# Patient Record
Sex: FEMALE | Race: WHITE | NOT HISPANIC OR LATINO | Employment: FULL TIME | URBAN - METROPOLITAN AREA
[De-identification: names, ages, dates, MRNs, and addresses within clinical notes are randomized per-mention and may not be internally consistent; named-entity substitution may affect disease eponyms.]

---

## 2017-03-08 ENCOUNTER — OFFICE VISIT (OUTPATIENT)
Dept: FAMILY MEDICINE | Facility: CLINIC | Age: 37
End: 2017-03-08
Payer: COMMERCIAL

## 2017-03-08 VITALS
TEMPERATURE: 97.8 F | WEIGHT: 196 LBS | HEIGHT: 63 IN | BODY MASS INDEX: 34.73 KG/M2 | DIASTOLIC BLOOD PRESSURE: 77 MMHG | HEART RATE: 74 BPM | SYSTOLIC BLOOD PRESSURE: 119 MMHG | OXYGEN SATURATION: 97 %

## 2017-03-08 DIAGNOSIS — J45.20 MILD INTERMITTENT ASTHMA WITHOUT COMPLICATION: ICD-10-CM

## 2017-03-08 DIAGNOSIS — B37.31 CANDIDIASIS OF VULVA AND VAGINA: ICD-10-CM

## 2017-03-08 DIAGNOSIS — Z00.00 ENCOUNTER FOR ROUTINE ADULT HEALTH EXAMINATION WITHOUT ABNORMAL FINDINGS: ICD-10-CM

## 2017-03-08 DIAGNOSIS — R82.90 BAD ODOR OF URINE: Primary | ICD-10-CM

## 2017-03-08 LAB
ALBUMIN UR-MCNC: NEGATIVE MG/DL
APPEARANCE UR: CLEAR
BILIRUB UR QL STRIP: NEGATIVE
CHOLEST SERPL-MCNC: 218 MG/DL
COLOR UR AUTO: YELLOW
GLUCOSE SERPL-MCNC: 89 MG/DL (ref 70–99)
GLUCOSE UR STRIP-MCNC: NEGATIVE MG/DL
HDLC SERPL-MCNC: 47 MG/DL
HGB UR QL STRIP: NEGATIVE
KETONES UR STRIP-MCNC: NEGATIVE MG/DL
LDLC SERPL CALC-MCNC: 149 MG/DL
LEUKOCYTE ESTERASE UR QL STRIP: NEGATIVE
MICRO REPORT STATUS: ABNORMAL
NITRATE UR QL: NEGATIVE
NONHDLC SERPL-MCNC: 171 MG/DL
PH UR STRIP: 5.5 PH (ref 5–7)
SP GR UR STRIP: <=1.005 (ref 1–1.03)
SPECIMEN SOURCE: ABNORMAL
TRIGL SERPL-MCNC: 110 MG/DL
URN SPEC COLLECT METH UR: NORMAL
UROBILINOGEN UR STRIP-ACNC: 0.2 EU/DL (ref 0.2–1)
WET PREP SPEC: ABNORMAL

## 2017-03-08 PROCEDURE — 82947 ASSAY GLUCOSE BLOOD QUANT: CPT | Performed by: FAMILY MEDICINE

## 2017-03-08 PROCEDURE — 81003 URINALYSIS AUTO W/O SCOPE: CPT | Performed by: FAMILY MEDICINE

## 2017-03-08 PROCEDURE — 99213 OFFICE O/P EST LOW 20 MIN: CPT | Mod: 25 | Performed by: FAMILY MEDICINE

## 2017-03-08 PROCEDURE — 80061 LIPID PANEL: CPT | Performed by: FAMILY MEDICINE

## 2017-03-08 PROCEDURE — 87210 SMEAR WET MOUNT SALINE/INK: CPT | Performed by: FAMILY MEDICINE

## 2017-03-08 PROCEDURE — 99395 PREV VISIT EST AGE 18-39: CPT | Performed by: FAMILY MEDICINE

## 2017-03-08 PROCEDURE — 36415 COLL VENOUS BLD VENIPUNCTURE: CPT | Performed by: FAMILY MEDICINE

## 2017-03-08 RX ORDER — ALBUTEROL SULFATE 90 UG/1
2 AEROSOL, METERED RESPIRATORY (INHALATION) EVERY 4 HOURS PRN
Qty: 1 INHALER | Refills: 12 | Status: SHIPPED | OUTPATIENT
Start: 2017-03-08

## 2017-03-08 RX ORDER — LORATADINE 10 MG/1
10 TABLET ORAL DAILY
Qty: 90 TABLET | Refills: 3 | COMMUNITY
Start: 2017-03-08

## 2017-03-08 RX ORDER — FLUCONAZOLE 150 MG/1
150 TABLET ORAL
Qty: 4 TABLET | Refills: 0 | Status: SHIPPED | OUTPATIENT
Start: 2017-03-08

## 2017-03-08 ASSESSMENT — PATIENT HEALTH QUESTIONNAIRE - PHQ9: 5. POOR APPETITE OR OVEREATING: NOT AT ALL

## 2017-03-08 ASSESSMENT — ANXIETY QUESTIONNAIRES
2. NOT BEING ABLE TO STOP OR CONTROL WORRYING: NOT AT ALL
3. WORRYING TOO MUCH ABOUT DIFFERENT THINGS: NOT AT ALL
1. FEELING NERVOUS, ANXIOUS, OR ON EDGE: NOT AT ALL
7. FEELING AFRAID AS IF SOMETHING AWFUL MIGHT HAPPEN: NOT AT ALL
GAD7 TOTAL SCORE: 1
6. BECOMING EASILY ANNOYED OR IRRITABLE: SEVERAL DAYS
5. BEING SO RESTLESS THAT IT IS HARD TO SIT STILL: NOT AT ALL

## 2017-03-08 NOTE — PATIENT INSTRUCTIONS
Thank you for choosing [facility].  You may be receiving a survey in the mail from Ginger Crockett regarding your visit today.  Please take a few minutes to complete and return the survey to let us know how we are doing.      If you have questions or concerns, please contact us via [employer] or you can contact your care team at [phone].    Our Clinic hours are:  Monday 6:40 am  to 7:00 pm  Tuesday -Friday 6:40 am to 5:00 pm    The [place] outpatient lab hours are:  Monday - Friday 6:10 am to 4:45 pm  Saturdays 7:00 am to 11:00 am  Appointments are required, call [phone]    If you have clinical questions after hours or would like to schedule an appointment,  call the clinic at [phone].

## 2017-03-08 NOTE — LETTER
[facility]  [address] [place] [address]  Phone: [phone]    March 9, 2017    Esther Workman  [address] [place] [address]          Dear MsYanet Workman,    The results of your recent lab tests were:    Lab test result showed high cholesterol. I highly recommend that she work on her diet and exercise .         Enclosed is a copy of these results.  If you have any further questions or problems, please contact our office.      Sincerely,      George Rodriguez MD / KAN

## 2017-03-08 NOTE — MR AVS SNAPSHOT
After Visit Summary   3/8/2017    Esther Workman    MRN: [id number]           Patient Information     Date Of Birth          1980        Visit Information        Provider Department      3/8/2017 9:40 AM George Rodriguez MD [facility]        Today's Diagnoses     Bad odor of urine    -  1    Mild intermittent asthma without complication        Candidiasis of vulva and vagina        Encounter for routine adult health examination without abnormal findings           Follow-ups after your visit        Who to contact     If you have questions or need follow up information about today's clinic visit or your schedule please contact [facility] directly at [phone].  Normal or non-critical lab and imaging results will be communicated to you by [employer], letter or phone within 4 business days after the clinic has received the results. If you do not hear from us within 7 days, please contact the clinic through [employer] or phone. If you have a critical or abnormal lab result, we will notify you by phone as soon as possible.  Submit refill requests through [employer] or call your pharmacy and they will forward the refill request to us. Please allow 3 business days for your refill to be completed.          Additional Information About Your Visit        MyChart Information     [employer] gives you secure access to your electronic health record. If you see a primary care provider, you can also send messages to your care team and make appointments. If you have questions, please call your primary care clinic.  If you do not have a primary care provider, please call [phone] and they will assist you.        Care EveryWhere ID     This is your Care EveryWhere ID. This could be used by other organizations to access your [place] medical records  [id number]        Your Vitals Were     Pulse Temperature Height Last Period Pulse Oximetry BMI (Body Mass Index)    74 97.8  F (36.6  C)  "(Tympanic) 5' 3\" (1.6 m) 08/15/2011 97% 34.72 kg/m2       Blood Pressure from Last 3 Encounters:   03/08/17 119/77   09/18/15 122/83   06/06/14 114/65    Weight from Last 3 Encounters:   03/08/17 196 lb (88.9 kg)   09/18/15 188 lb (85.3 kg)   06/06/14 189 lb (85.7 kg)              We Performed the Following     Glucose     Lipid Profile with reflex to direct LDL     UA reflex to Microscopic and Culture     Wet prep          Today's Medication Changes          These changes are accurate as of: 3/8/17 10:33 AM.  If you have any questions, ask your nurse or doctor.               Start taking these medicines.        Dose/Directions    fluconazole 150 MG tablet   Commonly known as:  DIFLUCAN   Used for:  Candidiasis of vulva and vagina   Started by:  George Rodriguez MD        Dose:  150 mg   Take 1 tablet (150 mg) by mouth every 3 days   Quantity:  4 tablet   Refills:  0            Where to get your medicines      These medications were sent to [facility] [address]  [address] [place] [address]     Phone:  [phone]     albuterol 108 (90 BASE) MCG/ACT Inhaler    fluconazole 150 MG tablet                Primary Care Provider Office Phone # Fax #    Leah Carolina Martinez [phone]       [address] [address]        Thank you!     Thank you for choosing [facility]  for your care. Our goal is always to provide you with excellent care. Hearing back from our patients is one way we can continue to improve our services. Please take a few minutes to complete the written survey that you may receive in the mail after your visit with us. Thank you!             Your Updated Medication List - Protect others around you: Learn how to safely use, store and throw away your medicines at www.disposemymeds.org.          This list is accurate as of: 3/8/17 10:33 AM.  Always use your most recent med list.       "             Brand Name Dispense Instructions for use    albuterol 108 (90 BASE) MCG/ACT Inhaler    PROAIR HFA/PROVENTIL HFA/VENTOLIN HFA    1 Inhaler    Inhale 2 puffs into the lungs every 4 hours as needed for shortness of breath / dyspnea       ALLEGRA PO      Take 30 mg by mouth daily       citalopram 40 MG tablet    celeXA    90 tablet    Take 1 tablet (40 mg) by mouth daily One daily       CLARITIN 10 MG tablet   Generic drug:  loratadine     90 tablet    Take 1 tablet (10 mg) by mouth daily       fluconazole 150 MG tablet    DIFLUCAN    4 tablet    Take 1 tablet (150 mg) by mouth every 3 days

## 2017-03-08 NOTE — PROGRESS NOTES
SUBJECTIVE:     CC: Esther Workman is an 37 year old woman who presents for preventive health visit.     Patient is a 37 yr old female here for her annual physical .She reports that she has had a vaginal odor for the last few days. She had a hysterectomy a few years back. She reports no vaginal discharge and no itching. No urinary symptoms . She is other wise healthy .She is also requesting medication refills on her inhaler.   Physical   Annual:     Getting at least 3 servings of Calcium per day::  Yes    Bi-annual eye exam::  Yes    Dental care twice a year::  Yes    Sleep apnea or symptoms of sleep apnea::  None    Diet::  Regular (no restrictions)    Frequency of exercise::  None    Taking medications regularly::  Yes    Medication side effects::  None    Additional concerns today::  YES         Asthma Follow-Up    Was ACT completed today?    Yes    ACT Total Scores 3/8/2017   ACT TOTAL SCORE -   ASTHMA ER VISITS -   ASTHMA HOSPITALIZATIONS -   ACT TOTAL SCORE (Goal Greater than or Equal to 20) 21   In the past 12 months, how many times did you visit the emergency room for your asthma without being admitted to the hospital? 0   In the past 12 months, how many times were you hospitalized overnight because of your asthma? 0       Recent asthma triggers that patient is dealing with: upper respiratory infections        Amount of exercise or physical activity: 4-5 days/week for an average of 15-30 minutes    Problems taking medications regularly: No    Medication side effects: none  Diet: regular (no restrictions)      ABDOMINAL PAIN     Onset: off and on 10yrs increased in Jan     Description:   Character: Dull ache and strong dull ache   Location: right middle  Quadrant and middle back  Radiation: Back    Intensity: moderate    Progression of Symptoms:  improving    Accompanying Signs & Symptoms:  Fever/Chills?: no   Gas/Bloating: YES  Nausea: YES  Vomitting: no   Diarrhea?: YES  Constipation:yes   Dysuria or  Hematuria: no    History:   Trauma: no   Previous similar pain: YES- before gallbladder surgery   Previous tests done: none    Precipitating factors:   Does the pain change with:     Food: no      BM: YES    Urination: no     Alleviating factors:  Lying on side     Therapies Tried and outcome: same     LMP:  not applicable     Vaginal Symptoms     Onset: 3-4-17    Description: Patient feel like she has an odor of cat pee  Vaginal Discharge: none   Itching (Pruritis): no   Burning sensation:  no   Odor: YES- cat pee    Accompanying Signs & Symptoms:  Pain with Urination: no   Abdominal Pain: YES- from something different   Fever: no    History:   Sexually active: YES  New Partner: no   Possibility of Pregnancy:  No    Precipitating factors:   Recent Antibiotic Use: no     Alleviating factors:  None    Therapies Tried and outcome: bathing     Today's PHQ-2 Score:   PHQ-2 ( 1999 Pfizer) 3/7/2017   Little interest or pleasure in doing things Not at all   Feeling down, depressed or hopeless Not at all   PHQ-2 Score 0       Abuse: Current or Past(Physical, Sexual or Emotional)- No  Do you feel safe in your environment - Yes    Social History   Substance Use Topics     Smoking status: Never Smoker     Smokeless tobacco: Never Used     Alcohol use Yes      Comment: VERY INFREQUENT     The patient does not drink >3 drinks per day nor >7 drinks per week.    Recent Labs   Lab Test  09/18/15   1021  07/03/13   0917   CHOL  207*  235*   HDL  42*  53   LDL  134*  162*   TRIG  153*  99   CHOLHDLRATIO  4.9  4.0       Reviewed orders with patient.  Reviewed health maintenance and updated orders accordingly - Yes    Mammo Decision Support:  Mammogram not appropriate for this patient based on age.    Pertinent mammograms are reviewed under the imaging tab.  History of abnormal Pap smear: Status post benign hysterectomy. Health Maintenance and Surgical History updated.    Reviewed and updated as needed this visit by clinical  staff  Tobacco  Allergies  Med Hx  Surg Hx  Fam Hx  Soc Hx        Reviewed and updated as needed this visit by Provider        Past Medical History   Diagnosis Date     Chickenpox       Past Surgical History   Procedure Laterality Date     Cholecystectomy, laporoscopic  2005     Hysterectomy vaginal  6/21/2012     Procedure: HYSTERECTOMY VAGINAL;  Total vaginal hysterectomy, cystoscopy;  Surgeon: Jennyfer Peck MD;  Location: [facility] OR       ROS:  C: NEGATIVE for fever, chills, change in weight  I: NEGATIVE for worrisome rashes, moles or lesions  E: NEGATIVE for vision changes or irritation  ENT: NEGATIVE for ear, mouth and throat problems  R: NEGATIVE for significant cough or SOB  B: NEGATIVE for masses, tenderness or discharge  CV: NEGATIVE for chest pain, palpitations or peripheral edema  GI: NEGATIVE for nausea, abdominal pain, heartburn, or change in bowel habits   female: Positive for vaginal odor  M: NEGATIVE for significant arthralgias or myalgia  N: NEGATIVE for weakness, dizziness or paresthesias  P: NEGATIVE for changes in mood or affect    Problem list, Medication list, Allergies, and Medical/Social/Surgical histories reviewed in [facility] and updated as appropriate.  Labs reviewed in EPIC  BP Readings from Last 3 Encounters:   03/08/17 119/77   09/18/15 122/83   06/06/14 114/65    Wt Readings from Last 3 Encounters:   03/08/17 196 lb (88.9 kg)   09/18/15 188 lb (85.3 kg)   06/06/14 189 lb (85.7 kg)                  Patient Active Problem List   Diagnosis     Intermittent asthma     CARDIOVASCULAR SCREENING; LDL GOAL LESS THAN 160     Mild major depression (H)     BMI 33.0-33.9,adult     S/P hysterectomy     Past Surgical History   Procedure Laterality Date     Cholecystectomy, laporoscopic  2005     Hysterectomy vaginal  6/21/2012     Procedure: HYSTERECTOMY VAGINAL;  Total vaginal hysterectomy, cystoscopy;  Surgeon: Jennyfer Peck MD;  Location: [facility] OR       Social History   Substance Use Topics      Smoking status: Never Smoker     Smokeless tobacco: Never Used     Alcohol use Yes      Comment: VERY INFREQUENT     Family History   Problem Relation Age of Onset     Hypertension Paternal Grandmother      DIABETES Paternal Grandmother      Hypertension Maternal Grandfather      Eye Disorder Maternal Grandfather      glaucoma and macular degen     GASTROINTESTINAL DISEASE Maternal Grandfather      ulcer     HEART DISEASE Maternal Grandfather      Depression Maternal Grandfather      HEART DISEASE Paternal Grandfather      Blood Disease Father      hepatitis     C.A.D. Father      HEART DISEASE Father 55     quad bypass -  of ARDS     Depression Mother      Hypertension Mother      Asthma No family hx of      Breast Cancer No family hx of      Cancer - colorectal No family hx of      Prostate Cancer No family hx of          Current Outpatient Prescriptions   Medication Sig Dispense Refill     loratadine (CLARITIN) 10 MG tablet Take 1 tablet (10 mg) by mouth daily 90 tablet 3     albuterol (PROAIR HFA/PROVENTIL HFA/VENTOLIN HFA) 108 (90 BASE) MCG/ACT Inhaler Inhale 2 puffs into the lungs every 4 hours as needed for shortness of breath / dyspnea 1 Inhaler 12     fluconazole (DIFLUCAN) 150 MG tablet Take 1 tablet (150 mg) by mouth every 3 days 4 tablet 0     citalopram (CELEXA) 40 MG tablet Take 1 tablet (40 mg) by mouth daily One daily (Patient not taking: Reported on 3/8/2017) 90 tablet 3     Fexofenadine HCl (ALLEGRA PO) Take 30 mg by mouth daily       [DISCONTINUED] albuterol (PROAIR HFA, PROVENTIL HFA, VENTOLIN HFA) 108 (90 BASE) MCG/ACT inhaler Inhale 2 puffs into the lungs every 4 hours as needed for shortness of breath / dyspnea 1 Inhaler 12     Allergies   Allergen Reactions     Polymyxin B Sulfate Itching     Trimethoprim Itching     OBJECTIVE:     [facility] 08/15/2011  EXAM:  GENERAL: healthy, alert and no distress  NECK: no adenopathy, no asymmetry, masses, or scars and thyroid normal to palpation  RESP:  lungs clear to auscultation - no rales, rhonchi or wheezes  CV: regular rate and rhythm, normal S1 S2, no S3 or S4, no murmur, click or rub, no peripheral edema and peripheral pulses strong  ABDOMEN: soft, nontender, no hepatosplenomegaly, no masses and bowel sounds normal   (female): normal female external genitalia, normal urethral meatus  and vaginal mucosa pink, moist, well rugated  MS: no gross musculoskeletal defects noted, no edema  SKIN: no suspicious lesions or rashes  Results for orders placed or performed in visit on 03/08/17   UA reflex to Microscopic and Culture   Result Value Ref Range    Color Urine Yellow     Appearance Urine Clear     Glucose Urine Negative NEG mg/dL    Bilirubin Urine Negative NEG    Ketones Urine Negative NEG mg/dL    Specific Gravity Urine <=1.005 1.003 - 1.035    Blood Urine Negative NEG    pH Urine 5.5 5.0 - 7.0 pH    Protein Albumin Urine Negative NEG mg/dL    Urobilinogen Urine 0.2 0.2 - 1.0 EU/dL    Nitrite Urine Negative NEG    Leukocyte Esterase Urine Negative NEG    Source Midstream Urine    Lipid Profile with reflex to direct LDL   Result Value Ref Range    Cholesterol 218 (H) <200 mg/dL    Triglycerides 110 <150 mg/dL    HDL Cholesterol 47 (L) >49 mg/dL    LDL Cholesterol Calculated 149 (H) <100 mg/dL    Non HDL Cholesterol 171 (H) <130 mg/dL   Glucose   Result Value Ref Range    Glucose 89 70 - 99 mg/dL   Wet prep   Result Value Ref Range    Specimen Description Vagina     Wet Prep (A)      No Trichomonas seen  No clue cells seen  Yeast seen      Micro Report Status FINAL 03/08/2017      ASSESSMENT/PLAN:     1. Encounter for routine adult health examination without abnormal findings  .Patient will be notified of results  - Lipid Profile with reflex to direct LDL  - Glucose    2. Bad odor of urine  UA negative  - UA reflex to Microscopic and Culture  - Wet prep    3. Mild intermittent asthma without complication  - albuterol (PROAIR HFA/PROVENTIL HFA/VENTOLIN HFA) 108  "(90 BASE) MCG/ACT Inhaler; Inhale 2 puffs into the lungs every 4 hours as needed for shortness of breath / dyspnea  Dispense: 1 Inhaler; Refill: 12    4. Candidiasis of vulva and vagina  - fluconazole (DIFLUCAN) 150 MG tablet; Take 1 tablet (150 mg) by mouth every 3 days  Dispense: 4 tablet; Refill: 0  - Wet prep was positive for yeast   COUNSELING:  Reviewed preventive health counseling, as reflected in patient instructions       Regular exercise       Healthy diet/nutrition       Vision screening         reports that she has never smoked. She has never used smokeless tobacco.    Estimated body mass index is 33.3 kg/(m^2) as calculated from the following:    Height as of 9/18/15: 5' 3\" (1.6 m).    Weight as of 9/18/15: 188 lb (85.3 kg).   Weight management plan: Discussed healthy diet and exercise guidelines and patient will follow up in 6 months in clinic to re-evaluate.    Counseling Resources:  ATP IV Guidelines  Pooled Cohorts Equation Calculator  Breast Cancer Risk Calculator  FRAX Risk Assessment  ICSI Preventive Guidelines  Dietary Guidelines for Americans, 2010  USDA's MyPlate  ASA Prophylaxis  Lung CA Screening    George Rodriguez MD  [facility]  Answers for HPI/ROS submitted by the patient on 3/7/2017   Q1: Little interest or pleasure in doing things: 0=Not at all  Q2: Feeling down, depressed or hopeless: 0=Not at all  PHQ-2 Score: 0    "

## 2017-03-08 NOTE — NURSING NOTE
"Chief Complaint   Patient presents with     Physical     Vaginal Problem     Abdominal Pain     Refill Request       Initial /77 (BP Location: Left arm, Cuff Size: Adult Regular)  Pulse 74  Temp 97.8  F (36.6  C) (Tympanic)  Ht 5' 3\" (1.6 m)  Wt 196 lb (88.9 kg)  LMP 08/15/2011  SpO2 97%  BMI 34.72 kg/m2 Estimated body mass index is 34.72 kg/(m^2) as calculated from the following:    Height as of this encounter: 5' 3\" (1.6 m).    Weight as of this encounter: 196 lb (88.9 kg).  Medication Reconciliation: complete  "

## 2017-03-09 ASSESSMENT — PATIENT HEALTH QUESTIONNAIRE - PHQ9: SUM OF ALL RESPONSES TO PHQ QUESTIONS 1-9: 0

## 2017-03-09 ASSESSMENT — ANXIETY QUESTIONNAIRES: GAD7 TOTAL SCORE: 1

## 2017-03-09 ASSESSMENT — ASTHMA QUESTIONNAIRES: ACT_TOTALSCORE: 21

## 2017-03-09 NOTE — PROGRESS NOTES
Please inform patient that lab test result showed high cholesterol. I highly recommend that she work on her diet and exercise .  Thank you.     George Rodriguez M.D.

## 2019-05-01 ENCOUNTER — TELEPHONE (OUTPATIENT)
Dept: FAMILY MEDICINE | Facility: CLINIC | Age: 39
End: 2019-05-01

## 2019-05-01 NOTE — TELEPHONE ENCOUNTER
5/1/2019    Call Regarding ReattributionPhysical    Attempt 1    Message with female    Comments: LEFT VOICEMAIL       Outreach   MELECIO

## 2019-11-07 ENCOUNTER — HEALTH MAINTENANCE LETTER (OUTPATIENT)
Age: 39
End: 2019-11-07

## 2019-12-04 NOTE — TELEPHONE ENCOUNTER
12/4/2019    Call Regarding ReattributionPhysical    Attempt 2    Comments: left miguel angel Boyer

## 2019-12-12 NOTE — TELEPHONE ENCOUNTER
12/12/2019    Call Regarding ReattributionPhysical       Attempt 3    Message on voicemail    Comments:       Outreach   GAY

## 2020-11-29 ENCOUNTER — HEALTH MAINTENANCE LETTER (OUTPATIENT)
Age: 40
End: 2020-11-29

## 2021-09-25 ENCOUNTER — HEALTH MAINTENANCE LETTER (OUTPATIENT)
Age: 41
End: 2021-09-25

## 2022-01-15 ENCOUNTER — HEALTH MAINTENANCE LETTER (OUTPATIENT)
Age: 42
End: 2022-01-15

## 2022-12-26 ENCOUNTER — HEALTH MAINTENANCE LETTER (OUTPATIENT)
Age: 42
End: 2022-12-26

## 2023-04-16 ENCOUNTER — HEALTH MAINTENANCE LETTER (OUTPATIENT)
Age: 43
End: 2023-04-16

## 2024-02-04 ENCOUNTER — HEALTH MAINTENANCE LETTER (OUTPATIENT)
Age: 44
End: 2024-02-04